# Patient Record
Sex: FEMALE | Race: BLACK OR AFRICAN AMERICAN
[De-identification: names, ages, dates, MRNs, and addresses within clinical notes are randomized per-mention and may not be internally consistent; named-entity substitution may affect disease eponyms.]

---

## 2017-07-06 NOTE — HPE
DATE OF ADMISSION:  2017

 

 

This lady is booked for elective repeat  section and bilateral tubal

ligation by Filluis enrique clip for a satisfied parity.  She is a 31-year-old  6
,

para 2, LMP 2016, EDC 2017.

 

HISTORY: Her past history is that she had a previous  section in  at

39 weeks of gestation because of breech presentation, 7 pounds 5 ounces.  She 
had

no progesterone. No fetal monitoring. In 2015 at 39 weeks because of

a short cervix and  labor she had Indocin and progesterone. At that time

she was in an AGD M1. She was on glyburide and an induction of labor at 39 weeks
;

delivered a male, 8 pounds 11 ounces.

 

This present history her risk factors are that she has diamniotic, dichorionic

(ETTA) twins. She is AGD M1. She has had a previous  section for breech

and she has declined trial of labor after  (TOLAC).

 

Her lab values are O+, HIV negative, hepatitis negative, RPR negative, rubella

immune.  Varicella by lab was positive.  Pap was normal.  Urine was negative.

Gonorrhea and chlamydia negative.  1-hour glucose was 128.  GBS status is

pending.

 

PHYSICAL EXAMINATION: On physical examination today she is in no acute distress.

She has a category one strip.  She is normocephalic, atraumatic.  Neck:  Full

range of motion.  Pupils equal and reactive to light.  Chest:  Distal pulses are

symmetric.  No evidence of deep venous thrombosis (DVT), pulmonary embolism (PE)

or superficial phlebitis.  Lungs are clear bilaterally to bases.  No wheezes or

rhonchi.  No CVA tenderness.  Symphysis fundus height is 39.  Twin A is breech

with an DUKE today of 11.  Twin B was transverse lie with an DUKE of 12.  Both

fetal hearts are present and active, 140-150. The rest of the examination is

unremarkable.

 

She has no rashes, lesions or pruritus.  No arthralgia or myalgia.  No 
complaints

of cough, wheezes, shortness of breath or dyspnea on exertion.  No chest pain.

She is not bleeding.  Neuro complete.  No incontinence, urgency or frequency.  
No

nausea, vomiting, diarrhea or constipation.  She has no diabetic issues.

 

GYNECOLOGY HISTORY: Unremarkable.

 

SURGICAL HISTORY: Primary  section.

 

FAMILY HISTORY: Noncontributory.

 

SOCIAL HISTORY: She does not smoke, drink, abuse drugs and there is no domestic

violence.

 

After discussing risks and benefits of  section, hemorrhage, infection,

perforation, death, reoperation, remote possibility of blood transfusion, remote

possibility of hysterectomy; we then discussed the tubal ligation by Filshie 
clip

with the failure rate of less than 1% ending in an IUP or an ectopic pregnancy.

Expressing understanding of both and all the risks, she signed and witnessed the

consent form.

 

Blood pressure today is 122/55. Symphysis fundus height is 38.  She weighs 220

pounds.  Her BMI is 34.46.

 

In summary we have a diamniotic, dichorionic twin gestation for repeat 

section and satisfied parity.



Edited: burton 2017 [id number] 

MTDD

## 2017-07-23 NOTE — IPN
DATE:  2017

 

This lady is a 31-year-old,  6, now para 4, was admitted for repeat

section and voluntary sterilization after satisfied parity.  Section was for twin

gestation Di/Di.  She delivered a live birth twin A male, 6 pounds 13 ounces

(3100 grams). Apgar of 9 and 9 at one and five minutes respectively.  Arterial pH

7.14, base excess -9.4, venous pH 7.28, basis excess -5.2.  Twin B was a female,

6 pounds 13 ounces (3078 grams).  Arterial pH was 7.19, base excess -4.2, venous

pH 7.29, base excess -5.3.

 

Her admitting hemoglobin was 10.5, hematocrit 32.4 and platelets are 176.

 

Postpartum day #1, hemoglobin is pending.

 

Presently, her blood pressure is 109/56, respirations 18, pulse 75.  Temperature

is 98.3.  She had an episode with Nubain, which required Narcan and she

immediately woke up and has been responsive and alert today.  She has had

Percocet with no issues.  So we will continue with the Percocet.

 

The rest the examination is unremarkable.  We discussed phlebitis, cystitis,

mastitis, endometritis and cellulitis, diet, exercise, pain management and

perineal, breast and wound care.

 

On examination today, she is normocephalic, atraumatic.  Neck full range of

motion.  Pupils equal and reactive to light.  She is oriented to three spheres.

Breast-feeding.  Distal pulses are symmetric.  No evidence of deep venous

thrombosis (DVT), pulmonary embolism (PE) or superficial phlebitis.  Her reflexes

are normal.  There is no edema.  Chest is clear to bases with no wheezes or

rhonchi.  No costovertebral angle (CVA) tenderness.  Abdomen soft.  Uterus 2

below.  Incision is clean and dry.  Perineum is intact.  She has no rashes,

lesions, pruritus.  No arthralgia, myalgia.  No complaint cough, wheezes,

shortness of breath or dyspnea on exertion.  No chest pain.  No bleeding.  Neuro

complete.  No incontinency or frequency.  No nausea, vomiting, diarrhea or

constipation.  No diabetic issues.

 

Past surgical history:  She had a section for breech.  She had ADGM1.  She does

not smoke, drink, abuse drugs.  No domestic violence.  She is  to a

soldier.

 

In summary, we have a lady with satisfied parity, repeat  section for

twins Di/Di.  Anticipate discharge tomorrow.

## 2017-08-01 NOTE — IPN
DATE:  2017

 

This patient and her  has requested circumcision of their  male

infant.  After discussing risks and benefits of circumcision, the medical and

nonmedical indications, penile block and aftercare, expressed understanding

penile block and aftercare, signed the witnessed consent form.  We await the

clearance by the pediatrician.

## 2017-08-02 NOTE — RO
DATE OF PROCEDURE: 2017

 

PREOPERATIVE DIAGNOSES: Grand multiparity. Repeat section. Previous section.

Satisfied parity. Dichorionic/diamniotic twin gestation.

 

POSTOPERATIVE DIAGNOSES: Grand multiparity. Repeat section. Previous section.

Satisfied parity. Dichorionic/diamniotic twin gestation.

 

OPERATION PERFORMED: Repeat  section for dichorionic/diamniotic twins and

satisfied parity. Tubal ligation by Filshie clip.

 

SURGEON: Joe Ortega MD

 

ASSISTANT: Lang Mart MD

 

ANESTHESIA: Spinal plus local anesthetic for intraperitoneal procedures.

 

ESTIMATED BLOOD LOSS: 500 mL

 

DESCRIPTION OF PROCEDURE: Under adequate anesthesia, prepped and draped in the

supine position, Yanez catheter in the bladder draining clear urine,

acetaminophen suppository 1300 mg per rectum, sequentials on board, appropriate

antibiotics preoperatively, a Pfannenstiel incision was made two fingerbreadths

above the symphysis pubis, passing through the abdominal layers, securing

hemostasis. Opening the peritoneal cavity, we reflected the bladder well down

anteriorly.

 

Artificial rupture of membranes (AROM) was done draining clear liquor.  We

delivered a live birth male infant, Baby A, weighing 6 pounds 13 ounces, 3100

grams, Apgar scores of 9 and 9 at one and five minutes, respectively. Arterial

and venous pH were performed. Arterial pH was 7.14, base excess was -9.4.

 

We then went ahead and did an AROM draining clear liquor. We delivered Baby B, a

female weighing 6 pounds 13 ounces, 3078 grams, Apgar scores of 8 and 9 at one

and five minutes, respectively. Arterial pH was 7.19, base excess -4.2, venous pH

was 7.29, base excess -5.3.

 

The placenta was manually removed. Three vessels were noted in each cord.

Membranes and tissues were intact. The uterus contracted well down on Pitocin. We

swept the uterine cavity for contents; there was nothing in the cavity. The lower

segment was oversewn in the usual fashion in two layers and with instrument and

pad count correct, we reviewed the anatomy; it appeared to be normal. No evidence

of active bleeding.

 

We then went ahead and asked the question regarding satisfied parity, if she

wanted to continue along with the tubal ligation, and responded in the

affirmative. Therefore, both tubes were exposed and visualized to the fimbriated

ends. Bilateral Filshie clips applied circumferentially around the tubes. Good

hemostasis was secured. Tubes and ovaries were replaced in the abdomen.

 

We then went ahead and closed the peritoneum, interrupted for the fascia,

irrigated the subcutaneous tissue, then interrupted sutures for subcutaneous, and

then a subcutaneous stitch was placed with local anesthetic 0.25% Marcaine spray

and Telfa were applied, and the patient was taken back to the recovery room in

good condition.

## 2019-03-24 ENCOUNTER — HOSPITAL ENCOUNTER (OUTPATIENT)
Dept: HOSPITAL 53 - M SLEEP | Age: 34
End: 2019-03-24
Attending: NURSE PRACTITIONER
Payer: COMMERCIAL

## 2019-03-24 DIAGNOSIS — R06.83: Primary | ICD-10-CM

## 2019-03-27 NOTE — SLEEPCENT
DATE OF PROCEDURE:  03/24/2019

 

ORDERED BY:  Codi Garcia

 

Nocturnal polysomnography was performed for evaluation of sleep physiology in

this patient with a history of snoring and excessive somnolence.

 

7 hours and 45 minutes of data were reviewed.  There were 303 minutes of sleep

identified.  Sleep latency was prolonged at 143 minutes.  Rapid eye movement

(REM) latency was prolonged at 167 minutes.  Sleep architecture was fair with 2

REM cycles noted.  Overall sleep efficiency was 66.6%.  The electrocardiogram

showed a sinus rhythm with an average heart rate of 56 beats per minute.

Electroencephalogram (EEG) showed normal waveforms for awake and sleep.  There

were only 7 respiratory events identified of 10 seconds in duration or greater

for an apnea-hypopnea index within normal limits at 1.4.  Snoring was noted,

however, respiratory related arousals occurred only once per hour.  There was

some limb activity noted in the EMG leads, but no trains of events.  Limb

movement arousal index was 5.3.  Significant snoring was however noted over the

entire study.

 

IMPRESSION:  Normal nocturnal polysomnography with snoring.

## 2021-11-12 ENCOUNTER — HOSPITAL ENCOUNTER (OUTPATIENT)
Dept: HOSPITAL 53 - M WHC | Age: 36
End: 2021-11-12
Payer: COMMERCIAL

## 2021-11-12 DIAGNOSIS — N64.89: Primary | ICD-10-CM

## 2021-11-12 NOTE — REP
INDICATION:

BREAST LUMPS.



COMPARISON:

None



TECHNIQUE:

Real-time sonographic evaluation of right breast performed.



FINDINGS:

Sonographic evaluation of right breast performed between 11 and 1 o'clock at the site

of a reported palpable lump which has been present for 4 years.  No cystic or solid

nodule is seen.





IMPRESSION:

BIRADS/ACR category 1, negative ultrasound right breast 11-1 o'clock.  No cystic or

solid nodule identified in the region of the reported palpable abnormality.



RECOMMENDATION:

Clinical correlation and follow-up recommended.  A negative ultrasound should not

deter biopsy if there is a clinically suspicious palpable mass present.





<Electronically signed by Jaime Lion > 11/12/21 1917

## 2022-01-20 ENCOUNTER — HOSPITAL ENCOUNTER (OUTPATIENT)
Dept: HOSPITAL 53 - M RADPRO | Age: 37
End: 2022-01-20
Attending: GENERAL PRACTICE
Payer: COMMERCIAL

## 2022-01-20 DIAGNOSIS — N97.9: Primary | ICD-10-CM

## 2022-01-20 PROCEDURE — 58340 CATHETER FOR HYSTEROGRAPHY: CPT

## 2022-01-20 PROCEDURE — 74740 X-RAY FEMALE GENITAL TRACT: CPT

## 2023-07-18 ENCOUNTER — HOSPITAL ENCOUNTER (OUTPATIENT)
Dept: HOSPITAL 53 - M OR | Age: 38
Setting detail: OBSERVATION
LOS: 1 days | Discharge: HOME | End: 2023-07-19
Attending: GENERAL PRACTICE | Admitting: GENERAL PRACTICE
Payer: COMMERCIAL

## 2023-07-18 VITALS — BODY MASS INDEX: 31.61 KG/M2 | WEIGHT: 208.6 LBS | HEIGHT: 68 IN

## 2023-07-18 VITALS — DIASTOLIC BLOOD PRESSURE: 83 MMHG | SYSTOLIC BLOOD PRESSURE: 144 MMHG | TEMPERATURE: 98.2 F | OXYGEN SATURATION: 97 %

## 2023-07-18 VITALS — DIASTOLIC BLOOD PRESSURE: 70 MMHG | OXYGEN SATURATION: 96 % | SYSTOLIC BLOOD PRESSURE: 125 MMHG | TEMPERATURE: 98.2 F

## 2023-07-18 VITALS — DIASTOLIC BLOOD PRESSURE: 67 MMHG | TEMPERATURE: 98.3 F | OXYGEN SATURATION: 97 % | SYSTOLIC BLOOD PRESSURE: 140 MMHG

## 2023-07-18 VITALS — OXYGEN SATURATION: 97 % | SYSTOLIC BLOOD PRESSURE: 139 MMHG | TEMPERATURE: 98.2 F | DIASTOLIC BLOOD PRESSURE: 64 MMHG

## 2023-07-18 VITALS — SYSTOLIC BLOOD PRESSURE: 138 MMHG | DIASTOLIC BLOOD PRESSURE: 62 MMHG | OXYGEN SATURATION: 95 % | TEMPERATURE: 98.4 F

## 2023-07-18 VITALS — SYSTOLIC BLOOD PRESSURE: 136 MMHG | OXYGEN SATURATION: 96 % | TEMPERATURE: 97.9 F | DIASTOLIC BLOOD PRESSURE: 63 MMHG

## 2023-07-18 VITALS — TEMPERATURE: 98.4 F | OXYGEN SATURATION: 96 % | DIASTOLIC BLOOD PRESSURE: 59 MMHG | SYSTOLIC BLOOD PRESSURE: 130 MMHG

## 2023-07-18 DIAGNOSIS — F41.9: ICD-10-CM

## 2023-07-18 DIAGNOSIS — D25.2: ICD-10-CM

## 2023-07-18 DIAGNOSIS — Z79.899: ICD-10-CM

## 2023-07-18 DIAGNOSIS — N93.9: ICD-10-CM

## 2023-07-18 DIAGNOSIS — D25.1: Primary | ICD-10-CM

## 2023-07-18 DIAGNOSIS — Z88.5: ICD-10-CM

## 2023-07-18 DIAGNOSIS — D64.9: ICD-10-CM

## 2023-07-18 LAB
ALBUMIN SERPL BCG-MCNC: 3.4 G/DL (ref 3.2–5.2)
ALP SERPL-CCNC: 49 U/L (ref 46–116)
ALT SERPL W P-5'-P-CCNC: 21 U/L (ref 7–40)
AST SERPL-CCNC: 18 U/L (ref ?–34)
BILIRUB SERPL-MCNC: 0.4 MG/DL (ref 0.3–1.2)
BUN SERPL-MCNC: 7 MG/DL (ref 9–23)
CALCIUM SERPL-MCNC: 8.8 MG/DL (ref 8.5–10.1)
CHLORIDE SERPL-SCNC: 108 MMOL/L (ref 98–107)
CO2 SERPL-SCNC: 25 MMOL/L (ref 20–31)
CREAT SERPL-MCNC: 0.61 MG/DL (ref 0.55–1.3)
GFR SERPL CREATININE-BSD FRML MDRD: > 60 ML/MIN/{1.73_M2} (ref 60–?)
GLUCOSE SERPL-MCNC: 81 MG/DL (ref 60–100)
HCT VFR BLD AUTO: 29.5 % (ref 36–47)
HGB BLD-MCNC: 8.7 G/DL (ref 12–15.5)
MCH RBC QN AUTO: 26.4 PG (ref 27–33)
MCHC RBC AUTO-ENTMCNC: 29.5 G/DL (ref 32–36.5)
MCV RBC AUTO: 89.4 FL (ref 80–96)
PLATELET # BLD AUTO: 368 10^3/UL (ref 150–450)
POTASSIUM SERPL-SCNC: 3.8 MMOL/L (ref 3.5–5.1)
PROT SERPL-MCNC: 6.7 G/DL (ref 5.7–8.2)
RBC # BLD AUTO: 3.3 10^6/UL (ref 4–5.4)
SODIUM SERPL-SCNC: 140 MMOL/L (ref 136–145)
WBC # BLD AUTO: 4.3 10^3/UL (ref 4–10)

## 2023-07-18 PROCEDURE — 96374 THER/PROPH/DIAG INJ IV PUSH: CPT

## 2023-07-18 PROCEDURE — 81025 URINE PREGNANCY TEST: CPT

## 2023-07-18 PROCEDURE — 58140 MYOMECTOMY ABDOM METHOD: CPT

## 2023-07-18 PROCEDURE — 96376 TX/PRO/DX INJ SAME DRUG ADON: CPT

## 2023-07-18 PROCEDURE — 80053 COMPREHEN METABOLIC PANEL: CPT

## 2023-07-18 PROCEDURE — 87635 SARS-COV-2 COVID-19 AMP PRB: CPT

## 2023-07-18 PROCEDURE — 85027 COMPLETE CBC AUTOMATED: CPT

## 2023-07-18 PROCEDURE — 96375 TX/PRO/DX INJ NEW DRUG ADDON: CPT

## 2023-07-18 PROCEDURE — 86850 RBC ANTIBODY SCREEN: CPT

## 2023-07-18 PROCEDURE — 85025 COMPLETE CBC W/AUTO DIFF WBC: CPT

## 2023-07-18 PROCEDURE — 36415 COLL VENOUS BLD VENIPUNCTURE: CPT

## 2023-07-18 PROCEDURE — 88305 TISSUE EXAM BY PATHOLOGIST: CPT

## 2023-07-18 RX ADMIN — KETOROLAC TROMETHAMINE SCH MG: 30 INJECTION, SOLUTION INTRAMUSCULAR at 20:32

## 2023-07-18 RX ADMIN — DOCUSATE SODIUM SCH MG: 100 CAPSULE, LIQUID FILLED ORAL at 20:32

## 2023-07-18 RX ADMIN — SODIUM CHLORIDE, POTASSIUM CHLORIDE, SODIUM LACTATE AND CALCIUM CHLORIDE SCH MLS/HR: 600; 310; 30; 20 INJECTION, SOLUTION INTRAVENOUS at 18:35

## 2023-07-19 VITALS — SYSTOLIC BLOOD PRESSURE: 130 MMHG | DIASTOLIC BLOOD PRESSURE: 64 MMHG | OXYGEN SATURATION: 100 % | TEMPERATURE: 98.4 F

## 2023-07-19 VITALS — TEMPERATURE: 98.4 F | SYSTOLIC BLOOD PRESSURE: 138 MMHG | OXYGEN SATURATION: 98 % | DIASTOLIC BLOOD PRESSURE: 64 MMHG

## 2023-07-19 VITALS — DIASTOLIC BLOOD PRESSURE: 91 MMHG | TEMPERATURE: 98.1 F | SYSTOLIC BLOOD PRESSURE: 147 MMHG | OXYGEN SATURATION: 100 %

## 2023-07-19 VITALS — TEMPERATURE: 98 F | OXYGEN SATURATION: 98 % | SYSTOLIC BLOOD PRESSURE: 138 MMHG | DIASTOLIC BLOOD PRESSURE: 80 MMHG

## 2023-07-19 LAB
BASOPHILS # BLD AUTO: 0 10^3/UL (ref 0–0.2)
BASOPHILS NFR BLD AUTO: 0.1 % (ref 0–1)
EOSINOPHIL # BLD AUTO: 0 10^3/UL (ref 0–0.5)
EOSINOPHIL NFR BLD AUTO: 0 % (ref 0–3)
HCT VFR BLD AUTO: 25.9 % (ref 36–47)
HGB BLD-MCNC: 7.8 G/DL (ref 12–15.5)
LYMPHOCYTES # BLD AUTO: 1.3 10^3/UL (ref 1.5–5)
LYMPHOCYTES NFR BLD AUTO: 9.4 % (ref 24–44)
MCH RBC QN AUTO: 26.9 PG (ref 27–33)
MCHC RBC AUTO-ENTMCNC: 30.1 G/DL (ref 32–36.5)
MCV RBC AUTO: 89.3 FL (ref 80–96)
MONOCYTES # BLD AUTO: 1.3 10^3/UL (ref 0–0.8)
MONOCYTES NFR BLD AUTO: 9.5 % (ref 2–8)
NEUTROPHILS # BLD AUTO: 11 10^3/UL (ref 1.5–8.5)
NEUTROPHILS NFR BLD AUTO: 80.2 % (ref 36–66)
PLATELET # BLD AUTO: 331 10^3/UL (ref 150–450)
RBC # BLD AUTO: 2.9 10^6/UL (ref 4–5.4)
WBC # BLD AUTO: 13.8 10^3/UL (ref 4–10)

## 2023-07-19 RX ADMIN — DOCUSATE SODIUM SCH MG: 100 CAPSULE, LIQUID FILLED ORAL at 07:47

## 2023-07-19 RX ADMIN — KETOROLAC TROMETHAMINE SCH MG: 30 INJECTION, SOLUTION INTRAMUSCULAR at 07:36

## 2023-07-19 RX ADMIN — SODIUM CHLORIDE, POTASSIUM CHLORIDE, SODIUM LACTATE AND CALCIUM CHLORIDE SCH MLS/HR: 600; 310; 30; 20 INJECTION, SOLUTION INTRAVENOUS at 07:47

## 2023-07-19 RX ADMIN — SODIUM CHLORIDE, POTASSIUM CHLORIDE, SODIUM LACTATE AND CALCIUM CHLORIDE SCH MLS/HR: 600; 310; 30; 20 INJECTION, SOLUTION INTRAVENOUS at 02:15

## 2023-07-19 RX ADMIN — KETOROLAC TROMETHAMINE SCH MG: 30 INJECTION, SOLUTION INTRAMUSCULAR at 02:19
